# Patient Record
Sex: MALE | URBAN - METROPOLITAN AREA
[De-identification: names, ages, dates, MRNs, and addresses within clinical notes are randomized per-mention and may not be internally consistent; named-entity substitution may affect disease eponyms.]

---

## 2017-07-17 PROBLEM — S82.401A CLOSED FRACTURE OF SHAFT OF RIGHT TIBIA AND FIBULA: Status: ACTIVE | Noted: 2017-07-17

## 2017-07-17 PROBLEM — S82.201A CLOSED FRACTURE OF SHAFT OF RIGHT TIBIA AND FIBULA: Status: ACTIVE | Noted: 2017-07-17

## 2017-07-31 ENCOUNTER — OFFICE VISIT (OUTPATIENT)
Dept: ORTHOPEDIC SURGERY | Age: 22
End: 2017-07-31

## 2017-07-31 DIAGNOSIS — S82.201D CLOSED FRACTURE OF SHAFT OF RIGHT TIBIA AND FIBULA, WITH ROUTINE HEALING, SUBSEQUENT ENCOUNTER: Primary | ICD-10-CM

## 2017-07-31 DIAGNOSIS — M79.604 RIGHT LEG PAIN: ICD-10-CM

## 2017-07-31 DIAGNOSIS — S82.401D CLOSED FRACTURE OF SHAFT OF RIGHT TIBIA AND FIBULA, WITH ROUTINE HEALING, SUBSEQUENT ENCOUNTER: Primary | ICD-10-CM

## 2017-07-31 PROCEDURE — 73590 X-RAY EXAM OF LOWER LEG: CPT | Performed by: ORTHOPAEDIC SURGERY

## 2017-07-31 PROCEDURE — L4361 PNEUMA/VAC WALK BOOT PRE OTS: HCPCS | Performed by: ORTHOPAEDIC SURGERY

## 2017-07-31 PROCEDURE — 99024 POSTOP FOLLOW-UP VISIT: CPT | Performed by: ORTHOPAEDIC SURGERY

## 2017-07-31 RX ORDER — OXYCODONE HYDROCHLORIDE AND ACETAMINOPHEN 5; 325 MG/1; MG/1
TABLET ORAL
Qty: 90 TABLET | Refills: 0 | Status: SHIPPED | OUTPATIENT
Start: 2017-07-31

## 2017-08-01 ENCOUNTER — TELEPHONE (OUTPATIENT)
Dept: ORTHOPEDIC SURGERY | Age: 22
End: 2017-08-01

## 2017-08-17 ENCOUNTER — OFFICE VISIT (OUTPATIENT)
Dept: ORTHOPEDIC SURGERY | Age: 22
End: 2017-08-17

## 2017-08-17 DIAGNOSIS — S82.201D CLOSED FRACTURE OF SHAFT OF RIGHT TIBIA AND FIBULA, WITH ROUTINE HEALING, SUBSEQUENT ENCOUNTER: Primary | ICD-10-CM

## 2017-08-17 DIAGNOSIS — S82.401D CLOSED FRACTURE OF SHAFT OF RIGHT TIBIA AND FIBULA, WITH ROUTINE HEALING, SUBSEQUENT ENCOUNTER: Primary | ICD-10-CM

## 2017-08-17 DIAGNOSIS — M79.661 PAIN IN RIGHT LOWER LEG: ICD-10-CM

## 2017-08-17 PROCEDURE — 99024 POSTOP FOLLOW-UP VISIT: CPT | Performed by: PHYSICIAN ASSISTANT

## 2017-08-17 PROCEDURE — 73590 X-RAY EXAM OF LOWER LEG: CPT | Performed by: PHYSICIAN ASSISTANT

## 2017-09-14 ENCOUNTER — OFFICE VISIT (OUTPATIENT)
Dept: ORTHOPEDIC SURGERY | Age: 22
End: 2017-09-14

## 2017-09-14 VITALS
SYSTOLIC BLOOD PRESSURE: 124 MMHG | BODY MASS INDEX: 18.49 KG/M2 | DIASTOLIC BLOOD PRESSURE: 82 MMHG | WEIGHT: 115.08 LBS | HEART RATE: 64 BPM | HEIGHT: 66 IN

## 2017-09-14 DIAGNOSIS — M79.604 RIGHT LEG PAIN: ICD-10-CM

## 2017-09-14 DIAGNOSIS — S82.201D CLOSED FRACTURE OF SHAFT OF RIGHT TIBIA AND FIBULA, WITH ROUTINE HEALING, SUBSEQUENT ENCOUNTER: Primary | ICD-10-CM

## 2017-09-14 DIAGNOSIS — S82.401D CLOSED FRACTURE OF SHAFT OF RIGHT TIBIA AND FIBULA, WITH ROUTINE HEALING, SUBSEQUENT ENCOUNTER: Primary | ICD-10-CM

## 2017-09-14 PROCEDURE — 99024 POSTOP FOLLOW-UP VISIT: CPT | Performed by: ORTHOPAEDIC SURGERY

## 2017-09-14 PROCEDURE — 73590 X-RAY EXAM OF LOWER LEG: CPT | Performed by: ORTHOPAEDIC SURGERY

## 2017-10-05 ENCOUNTER — OFFICE VISIT (OUTPATIENT)
Dept: ORTHOPEDIC SURGERY | Age: 22
End: 2017-10-05

## 2017-10-05 DIAGNOSIS — M79.604 RIGHT LEG PAIN: ICD-10-CM

## 2017-10-05 DIAGNOSIS — S82.201D CLOSED FRACTURE OF SHAFT OF RIGHT TIBIA AND FIBULA, WITH ROUTINE HEALING, SUBSEQUENT ENCOUNTER: ICD-10-CM

## 2017-10-05 DIAGNOSIS — S82.401D CLOSED FRACTURE OF SHAFT OF RIGHT TIBIA AND FIBULA, WITH ROUTINE HEALING, SUBSEQUENT ENCOUNTER: Primary | ICD-10-CM

## 2017-10-05 DIAGNOSIS — S82.201D CLOSED FRACTURE OF SHAFT OF RIGHT TIBIA AND FIBULA, WITH ROUTINE HEALING, SUBSEQUENT ENCOUNTER: Primary | ICD-10-CM

## 2017-10-05 DIAGNOSIS — S82.401D CLOSED FRACTURE OF SHAFT OF RIGHT TIBIA AND FIBULA, WITH ROUTINE HEALING, SUBSEQUENT ENCOUNTER: ICD-10-CM

## 2017-10-05 PROCEDURE — 73590 X-RAY EXAM OF LOWER LEG: CPT | Performed by: ORTHOPAEDIC SURGERY

## 2017-10-05 PROCEDURE — 99024 POSTOP FOLLOW-UP VISIT: CPT | Performed by: ORTHOPAEDIC SURGERY

## 2017-10-05 NOTE — PATIENT INSTRUCTIONS
12 veces. 2. Después de que se sienta cómodo con esto, pruebe a utilizar un tubo de goma enrollado alrededor de la parte externa de yoshi pies para tener más resistencia. Empuje el pie hacia afuera contra el tubo y luego cuente hasta 10 a medida que regresa el pie lentamente hacia el centro. Repita de 8 a 12 veces. Ejercicios isométricos de oposición    1. Estando sentado, ponga los pies juntos apoyados en el suelo. 2. Presione el pie lesionado hacia adentro, contra el otro pie. Mantenga la posición por alrededor de 6 segundos y luego relájese. Repita de 8 a 12 veces. 3. Luego, coloque el talón del otro pie en la parte superior del pie lesionado. Empuje hacia abajo con el talón que está arriba mientras trata de empujar hacia arriba con enrique pie lesionado. Mantenga la posición por alrededor de 6 segundos y luego relájese. Repita de 8 a 12 veces. Inversión del tobillo con resistencia    1. Siéntese en el suelo con la pierna ching cruzada por encima de la otra pierna. 2. Sostenga ambos extremos de gretel somers elástica y enróllela alrededor de la parte interior del pie afectado. Luego, presione el [employer] contra la somers. 3. Con las piernas cruzadas, empuje lentamente el pie afectado contra la somers para que sven pie se aleje del otro pie. Luego, relaje el pie poco a poco.  4. Repita entre 8 y 12 veces. Eversión del tobillo con resistencia    1. Siéntese en el suelo con las piernas estiradas. 2. Sujete ambos extremos de gretel somers elástica y enrolle la somers alrededor de la parte exterior del pie afectado. Luego, presione el [employer] contra la somers. 3. Con la pierna estirada, empuje lentamente el pie afectado hacia afuera contra la somers y aléjelo del otro pie sin dejar que la pierna se gire. Luego, relaje el pie poco a poco.  4. Repita entre 8 y 12 veces. Dorsiflexión del tobillo con resistencia    1. Ate los extremos de gretel somers elástica de manera que formen un círculo.  Ate un extremo del círculo a un objeto

## 2017-10-05 NOTE — MR AVS SNAPSHOT
Ate un extremo del círculo a un objeto seguro o sujételo con gretel yfn cerrada para mantenerlo en enrique lugar. (O puede hacer que otra persona sujete un extremo del círculo para ofrecer resistencia). 2. Sentado en el suelo o en gretel silla, coloque el otro extremo de la somers sobre el empeine del pie afectado. 3. Con la pierna y la rodilla estiradas, flexione lentamente el pie tirando de la somers elástica hacia atrás y, a continuación, relájelo poco a poco.  4. Repita de 8 a 12 veces. Equilibrio sobre gretel pierna    1. Colóquese de pie sobre gretel superficie plana con los brazos estirados hacia los lados brionna si estuviera haciendo la letra \"T\". Luego, levante la pierna ching del suelo, doblando donald rodilla. Si no tiene buen equilibrio, use gretel mano para sujetarse a gretel silla, encimera o pared. 2. Estando de pie sobre la pierna con el [place], [place] donald rodilla estirada. Trate de [employer] el equilibrio sobre donald pierna por hasta 30 segundos. Luego, descanse por hasta 10 segundos. 3. Repita de 6 a 8 veces. 4. Cuando pueda mantener el equilibrio sobre la pierna afectada por 30 segundos con los ojos abiertos, trate de equilibrarse sobre leslie con los ojos cerrados. Cuando pueda hacer francie ejercicio con los ojos cerrados por 27 segundos con facilidad y sin dolor, trate de ponerse de pie sobre gretel almohada o un pedazo de [place], y repita los pasos 1 a 4. La atención de seguimiento es gretel parte clave de enrique tratamiento y seguridad. Asegúrese de hacer y acudir a todas las citas, y llame a enrique médico si está teniendo problemas. También es gretel buena idea saber los resultados de yoshi exámenes y mantener gretel lista de los medicamentos que abby. ¿Dónde puede encontrar más información en inglés? Princella Hammans a [email or web address] e ingrese a enrique cuenta de MyChart.  Dorothy Zacarias en el [place] [place] \"Search Health Information\" para más información (en inglés) sobre \"Esguince de tobillo: Ejercicios de rehabilitación - [ Ankle Sprain: Rehab Exercises ]. \"     Si no tiene gretel cuenta, subha clic en el enlace \"Sign Up Now\". Revisado: [address], 2017  Versión del contenido: 11.3  © 6077-7959 [employer]wise, Inc[employer]. Las instrucciones de cuidado fueron adaptadas bajo licencia por [facility] ([place], [place]) HEALTH CARE ([facility]). Si usted tiene [place] afección médica o sobre estas instrucciones, siempre pregunte a enrique profesional de cristhian. [employer]wise, Incorporated niega toda garantía o responsabilidad por enrique uso de esta información. Management discussed and patient voiced understanding. They were instructed to follow up with their PCP regarding any abnormal vitals reading. Medications and Orders      Your Current Medications Are              oxyCODONE-acetaminophen (PERCOCET) 5-325 MG per tablet 1-2 EVERY 4-6 HOURS PRN. Allergies           No Known Allergies      We Ordered/Performed the following           XR TIBIA FIBULA RIGHT (2 VIEWS)     Comments:    [id number]         Result Summary for XR TIBIA FIBULA RIGHT (2 VIEWS)      Result Information     Status          Final result (Exam End: 10/5/2017  8:50 AM)           10/5/2017  8:50 AM      Narrative & Impression           Radiology result is complete; follow up with provider / physician office for radiology results                       Additional Information        Basic Information     Date Of Birth Sex Race Ethnicity Preferred Language    1995 Male Unavailable / Danish      Problem List as of 10/5/2017  Date Reviewed: 9/14/2017                Closed fracture of shaft of right tibia and fibula      Preventive Care        Date Due    HIV screening is recommended for all people regardless of risk factors  aged 15-65 years at least once (lifetime) who have never been HIV tested.  3/8/2011    Meningococcal Vaccine (1 of 1) 3/8/2012    Tetanus Combination Vaccine (1 - Tdap) 3/8/2015    Yearly Flu Vaccine (1) 9/1/2017            MyChart Signup [facility] allows you to send messages to your doctor, view your test results, renew your prescriptions, schedule appointments, view visit notes, and more. How Do I Sign Up? 1. In your Internet browser, go to [email or web address]  2. Click on the Sign Up Now link in the Sign In box. You will see the New Member Sign Up page. 3. Enter your [employer] Access Code exactly as it appears below. You will not need to use this code after youve completed the sign-up process. If you do not sign up before the expiration date, you must request a new code. [employer] Access Code: [email or web address]  Expires: 12/4/2017  8:56 AM    4. Enter your Social Security Number ([id number]) and Date of Birth (mm/dd/yyyy) as indicated and click Submit. You will be taken to the next sign-up page. 5. Create a [facility] ID. This will be your [facility] login ID and cannot be changed, so think of one that is secure and easy to remember. 6. Create a [facility] password. You can change your password at any time. 7. Enter your Password Reset Question and Answer. This can be used at a later time if you forget your password. 8. Enter your e-mail address. You will receive e-mail notification when new information is available in [address]. 9. Click Sign Up. You can now view your medical record. Additional Information  If you have questions, please contact the physician practice where you receive care. Remember, [employer] is NOT to be used for urgent needs. For medical emergencies, dial 911. For questions regarding your [employer] account call [phone]. If you have a clinical question, please call your doctor's office.

## 2017-10-05 NOTE — PROGRESS NOTES
History of present illness: The patient returns today after a right tibia IM etta 11 weeks ago. Pain control is satisfactory with oral medications. There have been no fevers or chills. The patient's been ambulating in his boot and occasionally taking it off at home without any discomfort. Pain Assessment  Location of Pain: Leg  Location Modifiers: Right  Severity of Pain: 0  Limiting Behavior: Some  Result of Injury: Yes  Work-Related Injury: No  Are there other pain locations you wish to document?: No]    Physical examination: The incision is clean, dry, and intact with no drainage or signs of infection. Neurovascular exam is intact. He still has some swelling in his foot and there is still some stiffness in the ankle joint but there is no evidence of any DVT. There is no tenderness over the fracture site. Radiographs: X-rays taken today including AP and lateral views of the right lower leg. There is a midshaft tibia and fibula fracture maintaining appropriate position. There is progressive healing and callus formation. Assessment/plan: At this time the patient is doing quite well. At this time we can go ahead and discontinue the boot immobilization and begin some ankle range of motion strengthening exercises. We've advised him to avoid high-impact aerobic activity or soccer for at least another 3 months. Follow-up in the office as needed.